# Patient Record
Sex: MALE | Race: WHITE | NOT HISPANIC OR LATINO
[De-identification: names, ages, dates, MRNs, and addresses within clinical notes are randomized per-mention and may not be internally consistent; named-entity substitution may affect disease eponyms.]

---

## 2022-12-29 PROBLEM — Z00.00 ENCOUNTER FOR PREVENTIVE HEALTH EXAMINATION: Status: ACTIVE | Noted: 2022-12-29

## 2023-01-03 ENCOUNTER — LABORATORY RESULT (OUTPATIENT)
Age: 28
End: 2023-01-03

## 2023-01-03 ENCOUNTER — APPOINTMENT (OUTPATIENT)
Dept: UROLOGY | Facility: CLINIC | Age: 28
End: 2023-01-03
Payer: COMMERCIAL

## 2023-01-03 ENCOUNTER — TRANSCRIPTION ENCOUNTER (OUTPATIENT)
Age: 28
End: 2023-01-03

## 2023-01-03 VITALS
HEIGHT: 74 IN | TEMPERATURE: 97 F | DIASTOLIC BLOOD PRESSURE: 72 MMHG | HEART RATE: 72 BPM | WEIGHT: 192 LBS | BODY MASS INDEX: 24.64 KG/M2 | OXYGEN SATURATION: 99 % | SYSTOLIC BLOOD PRESSURE: 108 MMHG

## 2023-01-03 DIAGNOSIS — Z78.9 OTHER SPECIFIED HEALTH STATUS: ICD-10-CM

## 2023-01-03 PROCEDURE — 99205 OFFICE O/P NEW HI 60 MIN: CPT

## 2023-01-03 RX ORDER — AMOXICILLIN AND CLAVULANATE POTASSIUM 500; 125 MG/1; MG/1
500-125 TABLET, FILM COATED ORAL
Qty: 14 | Refills: 0 | Status: ACTIVE | COMMUNITY
Start: 2023-01-03 | End: 1900-01-01

## 2023-01-03 NOTE — ASSESSMENT
[FreeTextEntry1] : 27 yr old male presents with persistent redness and rash to perineum, groin, and buttock area. We reassured him that this is likely a skin irritation and is self-limiting and not dangerous. We will send labs and treat with 1 week of Augmentin for possible bacterial cause. If it doesn't improve, we can consider topical fungal treatment. He was on antifungal oral medication for a prolonged period, so a antifungal is not necessary at this time as yeast infection is unlikely. \par \par 1. GC/Chlamydia, Ureaplasma, mycoplasma, trichomonas, syphilis, herpes \par 2. Augmentin BID x 7 days \par 3. RTC 1 month or sooner if needed

## 2023-01-03 NOTE — HISTORY OF PRESENT ILLNESS
[FreeTextEntry1] : 27 yr old male presents with generalized suprapubic rash/redness and general uncomfortable feeling x 6 months. He was initially treated with ceftriaxone and doxycycline. He then took cipro, flagyl, and azithromycin. During this time he was being treated with terbinafine for fungal infection of toenail. The dysuria has resolved. The rash has improved, but throughout the day it gets irritated and worse. He has no fever, chills, pain with ejaculation. He gets anal itching, and irritation between the glutes as well. He has used clotrimazole, Desitin ointment and it improves. He has previously seen 3 dermatologists and 2 urologists. \par \par His BMs are normal. He does not have to push or strain. \par \par \par UA- neg\par IPSS: 6

## 2023-01-03 NOTE — PHYSICAL EXAM
[General Appearance - Well Developed] : well developed [General Appearance - Well Nourished] : well nourished [Normal Appearance] : normal appearance [Well Groomed] : well groomed [General Appearance - In No Acute Distress] : no acute distress [Edema] : no peripheral edema [Respiration, Rhythm And Depth] : normal respiratory rhythm and effort [Exaggerated Use Of Accessory Muscles For Inspiration] : no accessory muscle use [Normal Station and Gait] : the gait and station were normal for the patient's age [Oriented To Time, Place, And Person] : oriented to person, place, and time [Affect] : the affect was normal [Mood] : the mood was normal [Not Anxious] : not anxious [Urethral Meatus] : meatus normal [Penis Abnormality] : normal circumcised penis [Testes Tenderness] : no tenderness of the testes [Testes Mass (___cm)] : there were no testicular masses [] : no rash [FreeTextEntry1] : scattered red spots in groin area

## 2023-01-04 ENCOUNTER — NON-APPOINTMENT (OUTPATIENT)
Age: 28
End: 2023-01-04

## 2023-01-10 ENCOUNTER — TRANSCRIPTION ENCOUNTER (OUTPATIENT)
Age: 28
End: 2023-01-10

## 2023-01-11 ENCOUNTER — APPOINTMENT (OUTPATIENT)
Dept: UROLOGY | Facility: CLINIC | Age: 28
End: 2023-01-11
Payer: COMMERCIAL

## 2023-01-11 ENCOUNTER — TRANSCRIPTION ENCOUNTER (OUTPATIENT)
Age: 28
End: 2023-01-11

## 2023-01-11 DIAGNOSIS — Z11.3 ENCOUNTER FOR SCREENING FOR INFECTIONS WITH A PREDOMINANTLY SEXUAL MODE OF TRANSMISSION: ICD-10-CM

## 2023-01-11 PROCEDURE — 99214 OFFICE O/P EST MOD 30 MIN: CPT | Mod: 95

## 2023-01-11 RX ORDER — AZITHROMYCIN 500 MG/1
500 TABLET, FILM COATED ORAL
Qty: 2 | Refills: 0 | Status: ACTIVE | COMMUNITY
Start: 2023-01-11 | End: 1900-01-01

## 2023-01-11 RX ORDER — DOXYCYCLINE HYCLATE 100 MG/1
100 CAPSULE ORAL
Qty: 14 | Refills: 0 | Status: ACTIVE | COMMUNITY
Start: 2023-01-11 | End: 1900-01-01

## 2023-01-11 NOTE — ASSESSMENT
[FreeTextEntry1] : 27 yr old male presents with persistent redness and rash to perineum, groin, and buttock area. He had previously been on long term courses of various antibiotics and antifungals without relief. He was on augmentin x 1 week most recently with some improvement. Ureaplasma returned positive. He has no urinary symptoms. We discussed that ureaplasma can be part of the normal genitourinary haylie and is an unlikely cause for his symptoms. However, given lack of other identifiable cause, he prefers to pursue treatment. Doxycycline x 1 week with azithromycin x 1 dose was sent to pharmacy. He understands that his sexual partner will need to be tested and treated for ureaplasma as well and both partners will need to abstain from sexual activity until tested negative. The genetic testing result of his urine and/or semen does not appear to be warrant treatment at this time as he is asymptomatic from a urinary perspective. He should bring the result to the next appointment.\par \par 1. Doxycycline 100 mg BID x 7 days\par 2. Azithromycin 1000 mg x 1\par 3. F/U in one week\par

## 2023-01-11 NOTE — HISTORY OF PRESENT ILLNESS
[Home] : at home, [unfilled] , at the time of the visit. [Medical Office: ([facility])___] : at the medical office located in  [Verbal consent obtained from patient] : the patient, [unfilled] [FreeTextEntry1] : 1/3/23: 27 yr old male presents with generalized suprapubic rash/redness and general uncomfortable feeling x 6 months. He was initially treated with ceftriaxone and doxycycline. He then took cipro, flagyl, and azithromycin. During this time he was being treated with terbinafine for fungal infection of toenail. The dysuria has resolved. The rash has improved, but throughout the day it gets irritated and worse. He has no fever, chills, pain with ejaculation. He gets anal itching, and irritation between the glutes as well. He has used clotrimazole, Desitin ointment and it improves. He has previously seen 3 dermatologists and 2 urologists. \par \par His BMs are normal. He does not have to push or strain. \par \par \par UA- neg\par IPSS: 6 \par \par 1/11/23: telehealth to discuss results. Ureaplasma returned positive. He completed a genotyping of urine and semen sample that also returned with enterococcus (I do not have this report yet). He has taken augmentin for one week, which did help with some of the irritation of the suprapubic area, though he has persistent symptoms.\par \par

## 2023-01-17 ENCOUNTER — TRANSCRIPTION ENCOUNTER (OUTPATIENT)
Age: 28
End: 2023-01-17

## 2023-01-17 RX ORDER — DOXYCYCLINE HYCLATE 100 MG/1
100 TABLET ORAL TWICE DAILY
Qty: 6 | Refills: 0 | Status: ACTIVE | COMMUNITY
Start: 2023-01-17 | End: 1900-01-01

## 2023-01-20 ENCOUNTER — NON-APPOINTMENT (OUTPATIENT)
Age: 28
End: 2023-01-20

## 2023-01-20 ENCOUNTER — TRANSCRIPTION ENCOUNTER (OUTPATIENT)
Age: 28
End: 2023-01-20

## 2023-01-31 ENCOUNTER — APPOINTMENT (OUTPATIENT)
Dept: UROLOGY | Facility: CLINIC | Age: 28
End: 2023-01-31
Payer: COMMERCIAL

## 2023-01-31 VITALS
DIASTOLIC BLOOD PRESSURE: 69 MMHG | TEMPERATURE: 97.7 F | OXYGEN SATURATION: 100 % | HEART RATE: 57 BPM | SYSTOLIC BLOOD PRESSURE: 108 MMHG

## 2023-01-31 DIAGNOSIS — Z22.39 CARRIER OF OTHER SPECIFIED BACTERIAL DISEASES: ICD-10-CM

## 2023-01-31 DIAGNOSIS — L08.9 LOCAL INFECTION OF THE SKIN AND SUBCUTANEOUS TISSUE, UNSPECIFIED: ICD-10-CM

## 2023-01-31 LAB
BILIRUB UR QL STRIP: NORMAL
CLARITY UR: CLEAR
COLLECTION METHOD: NORMAL
GLUCOSE UR-MCNC: NORMAL
HCG UR QL: 0.2 EU/DL
HGB UR QL STRIP.AUTO: NORMAL
KETONES UR-MCNC: NORMAL
LEUKOCYTE ESTERASE UR QL STRIP: NORMAL
NITRITE UR QL STRIP: NORMAL
PH UR STRIP: 6
PROT UR STRIP-MCNC: NORMAL
SP GR UR STRIP: 1.02

## 2023-01-31 PROCEDURE — 81003 URINALYSIS AUTO W/O SCOPE: CPT | Mod: QW

## 2023-01-31 PROCEDURE — 99214 OFFICE O/P EST MOD 30 MIN: CPT

## 2023-01-31 NOTE — PHYSICAL EXAM
[General Appearance - Well Developed] : well developed [General Appearance - Well Nourished] : well nourished [Normal Appearance] : normal appearance [Well Groomed] : well groomed [General Appearance - In No Acute Distress] : no acute distress [Abdomen Soft] : soft [Abdomen Tenderness] : non-tender [Costovertebral Angle Tenderness] : no ~M costovertebral angle tenderness [Edema] : no peripheral edema [] : no respiratory distress [Respiration, Rhythm And Depth] : normal respiratory rhythm and effort [Exaggerated Use Of Accessory Muscles For Inspiration] : no accessory muscle use [Oriented To Time, Place, And Person] : oriented to person, place, and time [Mood] : the mood was normal [Affect] : the affect was normal [Not Anxious] : not anxious [Normal Station and Gait] : the gait and station were normal for the patient's age [Urethral Meatus] : meatus normal [Urinary Bladder Findings] : the bladder was normal on palpation [Scrotum] : the scrotum was normal [Testes Mass (___cm)] : there were no testicular masses [FreeTextEntry1] : Mild erythema in creases. No visible perianal erythema

## 2023-01-31 NOTE — HISTORY OF PRESENT ILLNESS
[FreeTextEntry1] : This visit was provided via telehealth using real-time 2-way audio visual technology. The patient, OANH RENDON, was located at home, [address] [place] [address] , at the time of the visit. \par The provider, ADELA PAYNE, was located [place] [address] at the time of the visit. The patient, OANH RENDON and Provider participated in the telehealth encounter. \par Verbal consent for telehealth services was given on January 11, 2023 by the patient, OANH RENDON. \par \par 1/3/23: 27 yr old male presents with generalized suprapubic rash/redness and general uncomfortable feeling x 6 months. He was initially treated with ceftriaxone and doxycycline. He then took cipro, flagyl, and azithromycin. During this time he was being treated with terbinafine for fungal infection of toenail. The dysuria has resolved. The rash has improved, but throughout the day it gets irritated and worse. He has no fever, chills, pain with ejaculation. He gets anal itching, and irritation between the glutes as well. He has used clotrimazole, Desitin ointment and it improves. He has previously seen 3 dermatologists and 2 urologists. \par \par His BMs are normal. He does not have to push or strain. \par \par UA- neg\par IPSS: 6 \par \par 1/11/23: telehealth to discuss results. Ureaplasma returned positive. He completed a genotyping of urine and semen sample that also returned with enterococcus (I do not have this report yet). He has taken Augmentin for one week, which did help with some of the irritation of the suprapubic area, though he has persistent symptoms.\par \par 1/31/23:\par States he is feeling 85-90% better. He completed the course of Augmentin, 1 G azithromycin, and doxycycline. He also states the redness in the pubic has improved, there is some lingering redness in the groin area in the scrotum after exercise. His urination symptoms have improved. His girlfriend is currently being treated with doxycycline. \par \par

## 2023-01-31 NOTE — ASSESSMENT
[FreeTextEntry1] : 27 yr old male presents for followup after ureaplasma treatment and infection. He is about 90% better but still having some redness in the groin area. We encouraged him to continue to keep area clean and dry. We will repeat urine and STI testing today to confirm absence of infection. We will also repeat ureaplasma in 2 months to confirm the infection has cleared and not recurred. Patient would like to obtain semen analysis for fertility planning in the future. We also discussed prostatitis and that is diagnosed based on symptoms typically. We can obtain a semen culture to look for signs of inflammation, but it does not alone diagnose prostatitis. Patient understands and would like to proceed with a semen culture. \par \par -F/u ureaplasma, trichomonas, UA, UCx, GC/CL\par - F/u semen culture and semen analysis\par - RTC 2 months

## 2023-02-09 LAB
APPEARANCE: CLEAR
BACTERIA UR CULT: NORMAL
BACTERIA: NEGATIVE
BILIRUBIN URINE: NEGATIVE
BLOOD URINE: NEGATIVE
C TRACH RRNA SPEC QL NAA+PROBE: NOT DETECTED
COLOR: YELLOW
GLUCOSE QUALITATIVE U: NEGATIVE
HYALINE CASTS: 1 /LPF
KETONES URINE: NEGATIVE
LEUKOCYTE ESTERASE URINE: NEGATIVE
MICROSCOPIC-UA: NORMAL
MYCOPLASMA HOMINIS CULTURE: NEGATIVE
N GONORRHOEA RRNA SPEC QL NAA+PROBE: NOT DETECTED
NITRITE URINE: NEGATIVE
PH URINE: 6
PROTEIN URINE: ABNORMAL
RED BLOOD CELLS URINE: 3 /HPF
SOURCE AMPLIFICATION: NORMAL
SOURCE AMPLIFICATION: NORMAL
SPECIFIC GRAVITY URINE: 1.03
SQUAMOUS EPITHELIAL CELLS: 0 /HPF
T VAGINALIS RRNA SPEC QL NAA+PROBE: NOT DETECTED
UREAPLASMA CULTURE: NEGATIVE
UROBILINOGEN URINE: NORMAL
WHITE BLOOD CELLS URINE: 2 /HPF

## 2023-12-07 DIAGNOSIS — Z31.41 ENCOUNTER FOR FERTILITY TESTING: ICD-10-CM
